# Patient Record
Sex: MALE | Race: WHITE | Employment: OTHER | URBAN - METROPOLITAN AREA
[De-identification: names, ages, dates, MRNs, and addresses within clinical notes are randomized per-mention and may not be internally consistent; named-entity substitution may affect disease eponyms.]

---

## 2017-08-15 DIAGNOSIS — Z79.899 ENCOUNTER FOR LONG-TERM (CURRENT) USE OF MEDICATIONS: ICD-10-CM

## 2017-08-15 DIAGNOSIS — I10 HTN (HYPERTENSION): ICD-10-CM

## 2017-08-15 DIAGNOSIS — E11.9 TYPE 2 DIABETES MELLITUS (H): ICD-10-CM

## 2017-08-15 DIAGNOSIS — E78.5 HYPERLIPEMIA: ICD-10-CM

## 2017-08-15 DIAGNOSIS — I25.10 CAD (CORONARY ARTERY DISEASE): Primary | ICD-10-CM

## 2017-08-15 DIAGNOSIS — R53.83 FATIGUE: ICD-10-CM

## 2017-09-19 ENCOUNTER — HOSPITAL ENCOUNTER (OUTPATIENT)
Dept: CARDIOLOGY | Facility: CLINIC | Age: 77
Discharge: HOME | End: 2017-09-19
Attending: INTERNAL MEDICINE | Admitting: INTERNAL MEDICINE
Payer: MEDICARE

## 2017-09-19 DIAGNOSIS — Z79.899 ENCOUNTER FOR LONG-TERM (CURRENT) USE OF MEDICATIONS: ICD-10-CM

## 2017-09-19 DIAGNOSIS — E78.5 HYPERLIPEMIA: ICD-10-CM

## 2017-09-19 DIAGNOSIS — I10 HTN (HYPERTENSION): ICD-10-CM

## 2017-09-19 DIAGNOSIS — R53.83 FATIGUE: ICD-10-CM

## 2017-09-19 DIAGNOSIS — E11.9 TYPE 2 DIABETES MELLITUS (H): ICD-10-CM

## 2017-09-19 DIAGNOSIS — I25.10 CAD (CORONARY ARTERY DISEASE): ICD-10-CM

## 2017-09-19 PROCEDURE — 93306 TTE W/DOPPLER COMPLETE: CPT | Mod: 26 | Performed by: INTERNAL MEDICINE

## 2017-09-19 PROCEDURE — 93306 TTE W/DOPPLER COMPLETE: CPT

## 2020-06-04 ENCOUNTER — COMMITTEE REVIEW (OUTPATIENT)
Dept: TRANSPLANT | Facility: CLINIC | Age: 80
End: 2020-06-04

## 2020-06-04 ENCOUNTER — DOCUMENTATION ONLY (OUTPATIENT)
Dept: TRANSPLANT | Facility: CLINIC | Age: 80
End: 2020-06-04

## 2020-06-04 NOTE — COMMITTEE REVIEW
"Abdominal Committee Review Note     Evaluation Date: NO PKE  Committee Review Date: 6/3/2020 clinical agenda    Organ being evaluated for: Kidney    Transplant Phase: Referral  Transplant Status: Active    Transplant Coordinator: Kathryn Mccloud  Transplant Surgeon:  Pt was not seen at [facility].   Pt.self referred.    Referring Physician: Referred Self    Primary Diagnosis:TYpe 2 diabetic with diabetic nephropathy   Secondary Diagnosis: ESRD on HD    Committee Review Members:  Nephrology Jim Mckenzie MD, Jonn Garnica, APRN CNP   Nurse Kathryn Mccloud, RN   Nutrition Lisbeth Sanchez, RD   Pharmacist Tl Cash, [facility]    - Clinical Marilee Deutsch, [facility] [place] Marisaniya Lloyd, [facility] [place]   Transplant Jammie Martha Sewell PA-C, Alyce Germain, KRISTOPHER, Kristina Perez, KRISTOPHER, Margarita Mccarthy, KRISTOPHER, Larisa Calvin, RN, Deandra Greenberg, NP, Yessenia Sharma, KRISTOPHER, Michelle Stevens, KRISTOPHER, Victor Manuel Delcid MD, Lamar Nicole RN       Transplant Eligibility: Irreversible chronic kidney disease treated w/dialysis or expected need for dialysis    Committee Review Decision: Declined    Relative Contraindications: None, Frality    Absolute Contraindications: Other, Pt. self referred -per Dr. Hampton (Nephrology note in CE) :not a transplant candidate due to frality and age    Committee Chair Victor Manuel Delcid MD verbally attested to the committee's decision.    Committee Discussion Details:  Pt.came through intake as a self referral. Pt.brought to Selection Committee today as a clinical agenda to determine if pt.should be brought in for kidney transplant evaluation.  The following was found when I was  reviewing the pt's chart :  2/21/2020 Dr. Hampton  and 4/15/2020 ( [facility]) both documented in their note in CE \"not a transplant candidate due to frailty and age. \"  Per Dr. Cabrera and Dr. Delcid - pt.declined today to proceed with kidney evaluation.  Dr. Delcid stated to have a conversation with " the pt.and if he really felt he still wanted to pursue kidney transplant evaluation he should have a further conversation with his Nephrologist and have his Nephrologist send a referral. If pt.still wants to pursue kidney transplant referral here he can be brought in to discuss with a surgeon further first.

## 2020-06-05 ENCOUNTER — TELEPHONE (OUTPATIENT)
Dept: TRANSPLANT | Facility: CLINIC | Age: 80
End: 2020-06-05

## 2020-06-05 NOTE — TELEPHONE ENCOUNTER
"Pt.called me back yesterday late afternoon. I then called him back this AM. I explained to the pt.that I brought him to Selection Committee( 6/3/2020) as a clinical agenda item whether to  move forward or not with kidney transplant referral and evaluation. Pt.was declined at this time to move forward to evaluation due to note in CE from his Nephrology team \"not a transplant candidate due to frailty and age. \" Per Dr. Delcid  if he really felt he still wanted to pursue kidney transplant evaluation he should have a further conversation with his Nephrologist and have his Nephrologist send a referral and then it would be discussed again. Pt.informed that I generated and routed a letter that states this information to him and CC his Nephrologist and care team. Pt.has my contact information. Encounter will be officially closed for now.     "

## 2020-06-18 ENCOUNTER — DOCUMENTATION ONLY (OUTPATIENT)
Dept: TRANSPLANT | Facility: CLINIC | Age: 80
End: 2020-06-18